# Patient Record
Sex: MALE | Race: WHITE
[De-identification: names, ages, dates, MRNs, and addresses within clinical notes are randomized per-mention and may not be internally consistent; named-entity substitution may affect disease eponyms.]

---

## 2020-05-14 ENCOUNTER — HOSPITAL ENCOUNTER (EMERGENCY)
Dept: HOSPITAL 97 - ER | Age: 20
Discharge: HOME | End: 2020-05-14
Payer: COMMERCIAL

## 2020-05-14 VITALS — TEMPERATURE: 98.7 F

## 2020-05-14 VITALS — DIASTOLIC BLOOD PRESSURE: 79 MMHG | SYSTOLIC BLOOD PRESSURE: 134 MMHG

## 2020-05-14 VITALS — OXYGEN SATURATION: 100 %

## 2020-05-14 DIAGNOSIS — T39.1X4A: ICD-10-CM

## 2020-05-14 DIAGNOSIS — G40.509: ICD-10-CM

## 2020-05-14 DIAGNOSIS — T43.224A: Primary | ICD-10-CM

## 2020-05-14 DIAGNOSIS — F32.9: ICD-10-CM

## 2020-05-14 LAB
ALBUMIN SERPL BCP-MCNC: 4.4 G/DL (ref 3.4–5)
ALP SERPL-CCNC: 84 U/L (ref 45–117)
ALT SERPL W P-5'-P-CCNC: 17 U/L (ref 12–78)
AST SERPL W P-5'-P-CCNC: 14 U/L (ref 15–37)
BUN BLD-MCNC: 12 MG/DL (ref 7–18)
GLUCOSE SERPLBLD-MCNC: 115 MG/DL (ref 74–106)
HCT VFR BLD CALC: 39.4 % (ref 39.6–49)
INR BLD: 1.36
LYMPHOCYTES # SPEC AUTO: 1.1 K/UL (ref 0.7–4.9)
METHAMPHET UR QL SCN: NEGATIVE
MORPHOLOGY BLD-IMP: (no result)
PMV BLD: 9.4 FL (ref 7.6–11.3)
POTASSIUM SERPL-SCNC: 3.5 MMOL/L (ref 3.5–5.1)
RBC # BLD: 4.56 M/UL (ref 4.33–5.43)
THC SERPL-MCNC: NEGATIVE NG/ML

## 2020-05-14 PROCEDURE — 81003 URINALYSIS AUTO W/O SCOPE: CPT

## 2020-05-14 PROCEDURE — 85730 THROMBOPLASTIN TIME PARTIAL: CPT

## 2020-05-14 PROCEDURE — 96361 HYDRATE IV INFUSION ADD-ON: CPT

## 2020-05-14 PROCEDURE — 70450 CT HEAD/BRAIN W/O DYE: CPT

## 2020-05-14 PROCEDURE — 36415 COLL VENOUS BLD VENIPUNCTURE: CPT

## 2020-05-14 PROCEDURE — 80307 DRUG TEST PRSMV CHEM ANLYZR: CPT

## 2020-05-14 PROCEDURE — 80048 BASIC METABOLIC PNL TOTAL CA: CPT

## 2020-05-14 PROCEDURE — 93005 ELECTROCARDIOGRAM TRACING: CPT

## 2020-05-14 PROCEDURE — 85025 COMPLETE CBC W/AUTO DIFF WBC: CPT

## 2020-05-14 PROCEDURE — 80329 ANALGESICS NON-OPIOID 1 OR 2: CPT

## 2020-05-14 PROCEDURE — 96360 HYDRATION IV INFUSION INIT: CPT

## 2020-05-14 PROCEDURE — 85610 PROTHROMBIN TIME: CPT

## 2020-05-14 PROCEDURE — 99285 EMERGENCY DEPT VISIT HI MDM: CPT

## 2020-05-14 PROCEDURE — 83735 ASSAY OF MAGNESIUM: CPT

## 2020-05-14 PROCEDURE — 80076 HEPATIC FUNCTION PANEL: CPT

## 2020-05-14 PROCEDURE — 80320 DRUG SCREEN QUANTALCOHOLS: CPT

## 2020-05-14 NOTE — ER
Nurse's Notes                                                                                     

 [facility]                                                                 

Name: Dexter Marks                                                                               

Age: 19 yrs                                                                                       

Sex: Male                                                                                         

: 2000                                                                                   

MRN: [id number]                                                                                   

Arrival Date: 2020                                                                          

Time: 08:23                                                                                       

Account#: [id number]                                                                            

Bed 3                                                                                             

Private MD:                                                                                       

Diagnosis: Poisoning by other drugs, medicaments and biological substances, undetermined-zoloft   

  and prozac;Epileptic seizures related to external causes, not intractable                       

                                                                                                  

Presentation:                                                                                     

                                                                                             

08:34 Chief complaint: EMS states: Pt took 47 20 mg Prozac last night at 2030; nurse at the   [facility] 

      snf witnessed pt have a seizure at 0700 this morning, pt seizure resolved without        

      intervention. Pt A\T\O for triage, reports mild chest pain. Coronavirus screen: Surgical    

      mask placed on patient. Patient moved to private room, placed in contact and droplet        

      isolation with eye protection until further assessment. Patient denies a cough. Patient     

      denies shortness of breath or difficulty breathing. Patient denies measured and/or          

      subjective temperature greater than 100.4F prior to today's visit. Patient denies           

      travel on a cruise ship or to a country the [facility] currently lists as an affected area.        

      Patient denies contact with known and/or suspected case of COVID-19. Pt from [facility]        

      [facility]. Ebola Screen: No symptoms or risks identified at this time. Initial Sepsis            

      Screen: Does the patient meet any 2 criteria? No. Patient's initial sepsis screen is        

      negative. Does the patient have a suspected source of infection? No. Patient's initial      

      sepsis screen is negative. Risk Assessment: Do you want to hurt yourself or someone         

      else? Patient reports no desire to harm self or others. Onset of symptoms was May 14,       

      2020 at 07:00. Care prior to arrival: None. Transition of care: [facility].               

08:34 Method Of Arrival: EMS: [facility]                                                      [facility] 

08:34 Acuity: CHRIS 3                                                                           jl7 

                                                                                                  

Historical:                                                                                       

- Allergies:                                                                                      

08:43 No Known Allergies;                                                                     jl7 

- Home Meds:                                                                                      

08:43 fluoxetine 20 mg Oral cap [Active];                                                     jl7 

- PMHx:                                                                                           

08:43 Depression;                                                                             jl7 

                                                                                                  

- Immunization history:: Adult Immunizations up to date.                                          

- Social history:: Smoking status: unknown.                                                       

- Family history:: not pertinent.                                                                 

                                                                                                  

                                                                                                  

Screenin:51 Abuse screen: Denies threats or abuse. Denies injuries from another. Nutritional        jl7 

      screening: No deficits noted. Tuberculosis screening: No symptoms or risk factors           

      identified. Fall Risk IV access (20 points). Total Flores Fall Scale indicates No Risk       

      (0-24 pts).                                                                                 

                                                                                                  

Assessment:                                                                                       

08:30 Reassessment: Ruby at [facility] poison control reports serotonin syndrome it the main  jl7 

      concern, watch for agitation, tachycardia, seizures, prolonged QT interval. Monitor pt      

      until labs come back and are normal and VS are trending down.                               

08:35 Reassessment: Pt states "I took Zoloft and fluoxetine because I wanted to get high." pt jl7 

      denies SI/HI.                                                                               

08:51 General: Appears in no apparent distress. uncomfortable, Behavior is calm, cooperative, jl7 

      appropriate for age. Pain: Complains of pain in chest Pain currently is 3 out of 10 on      

      a pain scale. Neuro: Level of Consciousness is awake, alert, obeys commands, Oriented       

      to person, place, time, situation. Cardiovascular: Patient's skin is warm and dry.          

      Respiratory: Airway is patent Respiratory effort is even, unlabored, Respiratory            

      pattern is regular, symmetrical. Derm: Skin is pink, warm \T\ dry.                          

09:48 Reassessment: Pt c/o HA, TANG notified, see MAR for orders.                              jl7 

10:29 Reassessment: Pt will be discharged once repeat EKG and urine sample are provided.      jl7 

10:32 Reassessment: Pt unable to provide urine sample, ERD notified, VO for 1L NS Bolus.      jl7 

                                                                                                  

Vital Signs:                                                                                      

08:34  / 75; Pulse 116; Resp 14; Temp 98.7; Pulse Ox 96% ;                              jl7 

09:48  / 76; Pulse 104; Resp 16 S; Pulse Ox 96% on R/A;                                 jl7 

10:30  / 80; Pulse 98; Resp 16 S; Pulse Ox 100% on R/A;                                 jl7 

11:00  / 85; Pulse 97; Resp 16; Pulse Ox 100% ;                                         jl7 

11:30  / 82; Pulse 92; Resp 16; Pulse Ox 100% ;                                         jl7 

11:59  / 79; Pulse 92; Resp 16; Pulse Ox 100% ;                                         jl7 

                                                                                                  

ED Course:                                                                                        

08:23 Patient arrived in ED.                                                                  [facility] 

08:23 Nam Junior MD is Attending Physician.                                             [facility] 

08:24 Chikis Rothman, RN is Primary Nurse.                                                      jl7 

08:42 Triage completed.                                                                       jl7 

08:43 Arm band placed on right wrist.                                                         jl7 

08:51 Patient has correct armband on for positive identification. Placed in gown. Bed in low  jl7 

      position. Call light in reach. Side rails up X2. Security at bedside. Seizure               

      precautions initiated. Cardiac monitor on. Pulse ox on. NIBP on. Warm blanket given.        

08:51 Initial lab(s) drawn, by ED staff, sent to lab. EKG done, by ED staff, reviewed by      teresita Junior MD. Inserted saline lock: 20 gauge in left hand, using aseptic               

      technique. Blood collected.                                                                 

09:15 CT Head Brain wo Cont In Process Unspecified.                                           [facility]

09:17 Lab(s) recollected, by me, sent to lab.                                                 [facility] 

09:18 EKG done, by EKG tech. reviewed by Nam Junior MD.                                   tc  

10:25 Oracio Reyes MD is Referral Physician.                                             [facility] 

10:51 EKG done, by EKG tech. reviewed by Nam Junior MD.                                   tc  

12:01 No provider procedures requiring assistance completed. IV discontinued, intact,         jl7 

      bleeding controlled, No redness/swelling at site. Pressure dressing applied.                

                                                                                                  

Administered Medications:                                                                         

08:51 Drug: NS 0.9% 1000 ml Route: IV; Rate: 1 bolus; Site: left hand;                        jl7 

10:00 Follow up: Response: No adverse reaction; IV Status: Completed infusion; IV Intake:     jl7 

      1000ml                                                                                      

09:30 Drug: Tylenol 650 mg Route: PO;                                                         jl7 

11:58 Follow up: Response: No adverse reaction; Pain is decreased                             jl7 

10:33 Drug: NS 0.9% 1000 ml Route: IV; Rate: 1 bolus; Site: right hand;                       jl7 

11:15 Follow up: Response: No adverse reaction; IV Status: Completed infusion; IV Intake:     jl7 

      1000ml                                                                                      

                                                                                                  

                                                                                                  

Intake:                                                                                           

10:00 IV: 1000ml; Total: 1000ml.                                                              jl7 

11:15 IV: 1000ml; Total: 2000ml.                                                              jl7 

                                                                                                  

Outcome:                                                                                          

10:25 Discharge ordered by MD.                                                                [facility] 

12:01 Discharged to [employer]                                                           jl7 

12:01 Condition: stable                                                                           

12:01 Discharge instructions given to patient, Instructed on discharge instructions, follow       

      up and referral plans. Demonstrated understanding of instructions, follow-up care.          

12:01 Patient left the ED.                                                                    jl7 

                                                                                                  

Signatures:                                                                                       

Dispatcher [employer]                           Nam Groves MD MD cha Callis, Tiffany, EKG Tech               EK Chikis Magaña RN                        RN   jl7                                                  

                                                                                                  

**************************************************************************************************

## 2020-05-14 NOTE — RAD REPORT
EXAM DESCRIPTION:  CT - Head Brain Wo Cont - 5/14/2020 9:15 am

 

CLINICAL HISTORY:  SEIZURE

Headache, drowsiness, seizure

 

COMPARISON:  No comparisons

 

TECHNIQUE:  All CT scans are performed using dose optimization technique as appropriate and may inclu
de automated exposure control or mA/KV adjustment according to patient size.

 

FINDINGS:  No intracranial hemorrhage, hydrocephalus or extra-axial fluid collection.No areas of brai
n edema or evidence of midline shift.

 

The paranasal sinuses and mastoids are clear. The calvarium is intact.

 

IMPRESSION:  No acute intracranial abnormality.

## 2020-05-14 NOTE — EKG
Test Date:    2020-05-14               Test Time:    08:33:58

Technician:   ABRIL                                    

                                                     

MEASUREMENT RESULTS:                                       

Intervals:                                           

Rate:         114                                    

WI:           138                                    

QRSD:         90                                     

QT:           346                                    

QTc:          476                                    

Axis:                                                

P:            67                                     

WI:           138                                    

QRS:          78                                     

T:            34                                     

                                                     

INTERPRETIVE STATEMENTS:                                       

                                                     

Sinus tachycardia

Otherwise normal ECG

No previous ECG available for comparison



Electronically Signed On 05-14-20 11:13:25 CDT by Orville Hanson

## 2020-05-14 NOTE — EDPHYS
Physician Documentation                                                                           

 [facility]                                                                 

Name: Dexter Marks                                                                               

Age: 19 yrs                                                                                       

Sex: Male                                                                                         

: 2000                                                                                   

MRN: [id number]                                                                                   

Arrival Date: 2020                                                                          

Time: 08:23                                                                                       

Account#: [id number]                                                                            

Bed 3                                                                                             

Private MD:                                                                                       

ED Physician Nam Junior                                                                      

HPI:                                                                                              

                                                                                             

08:27 This 19 yrs old  Male presents to ER via Unassigned with complaints of         quynh 

      overdose last night.                                                                        

08:27 The patient presents to the emergency department after a known overdose, that was       quynh 

      intentional. Context: Method: the patient has a confirmed or suspected ingestion,           

      zoloft 20 tabs, prozac 7 tabs, 2 tylenol. Associated signs and symptoms: Pertinent          

      positives: palpitations. Severity of symptoms: At their worst the symptoms were mild in     

      the emergency department the symptoms are unchanged. The patient presents after having      

      a single isolated seizure, that lasted 1 minute(s). Character of seizure(s): Loss of        

      consciousness: the patient experienced loss of consciousness, Motor activity:               

      generalized, Incontinence: none, Apnea: the patient did not experience apnea,               

      Circulation: the patient did not experience evidence of pulse disturbance. Seizure          

      onset: this morning. Context: the seizure(s) was witnessed, [facility]. Seizure Hx:        

      Original onset: since childhood,\E\ Last seizure: The patient's last seizure is unknown.    

                                                                                                  

Historical:                                                                                       

- Allergies:                                                                                      

08:43 No Known Allergies;                                                                     jl7 

- Home Meds:                                                                                      

08:43 fluoxetine 20 mg Oral cap [Active];                                                     jl7 

- PMHx:                                                                                           

08:43 Depression;                                                                             jl7 

                                                                                                  

- Immunization history:: Adult Immunizations up to date.                                          

- Social history:: Smoking status: unknown.                                                       

- Family history:: not pertinent.                                                                 

                                                                                                  

                                                                                                  

ROS:                                                                                              

08:27 Constitutional: Negative for fever, chills, and weight loss, Eyes: Negative for injury, quynh 

      pain, redness, and discharge, ENT: Negative for injury, pain, and discharge, Neck:          

      Negative for injury, pain, and swelling, Respiratory: Negative for shortness of breath,     

      cough, wheezing, and pleuritic chest pain, Abdomen/GI: Negative for abdominal pain,         

      nausea, vomiting, diarrhea, and constipation, Back: Negative for injury and pain, :       

      Negative for injury, bleeding, discharge, and swelling, MS/Extremity: Negative for          

      injury and deformity, Skin: Negative for injury, rash, and discoloration, Psych:            

      Negative for depression, anxiety, suicide ideation, homicidal ideation, and                 

      hallucinations, Allergy/Immunology: Negative for hives, rash, and allergies, Endocrine:     

      Negative for neck swelling, polydipsia, polyuria, polyphagia, and marked weight             

      changes, Hematologic/Lymphatic: Negative for swollen nodes, abnormal bleeding, and          

      unusual bruising.                                                                           

08:27 Cardiovascular: Positive for palpitations.                                                  

08:27 Neuro: Positive for seizure activity, weakness, this morning in [facility] this            

      morning.                                                                                    

                                                                                                  

Exam:                                                                                             

08:27 Constitutional:  This is a well developed, well nourished patient who is awake, alert,  quynh 

      and in no acute distress. Head/Face:  Normocephalic, atraumatic. Eyes:  Pupils equal        

      round and reactive to light, extra-ocular motions intact.  Lids and lashes normal.          

      Conjunctiva and sclera are non-icteric and not injected.  Cornea within normal limits.      

      Periorbital areas with no swelling, redness, or edema. ENT:  Nares patent. No nasal         

      discharge, no septal abnormalities noted.  Tympanic membranes are normal and external       

      auditory canals are clear.  Oropharynx with no redness, swelling, or masses, exudates,      

      or evidence of obstruction, uvula midline.  Mucous membranes moist. Neck:  Trachea          

      midline, no thyromegaly or masses palpated, and no cervical lymphadenopathy.  Supple,       

      full range of motion without nuchal rigidity, or vertebral point tenderness.  No            

      Meningismus. Chest/axilla:  Normal chest wall appearance and motion.  Nontender with no     

      deformity.  No lesions are appreciated. Respiratory:  Lungs have equal breath sounds        

      bilaterally, clear to auscultation and percussion.  No rales, rhonchi or wheezes noted.     

       No increased work of breathing, no retractions or nasal flaring. Abdomen/GI:  Soft,        

      non-tender, with normal bowel sounds.  No distension or tympany.  No guarding or            

      rebound.  No evidence of tenderness throughout. Back:  No spinal tenderness.  No            

      costovertebral tenderness.  Full range of motion. Male :  Normal genitalia with no        

      discharge or lesions. Skin:  Warm, dry with normal turgor.  Normal color with no            

      rashes, no lesions, and no evidence of cellulitis. MS/ Extremity:  Pulses equal, no         

      cyanosis.  Neurovascular intact.  Full, normal range of motion. Neuro:  Awake and           

      alert, GCS 15, oriented to person, place, time, and situation.  Cranial nerves II-XII       

      grossly intact.  Motor strength 5/5 in all extremities.  Sensory grossly intact.            

      Cerebellar exam normal.  Normal gait. Psych:  Awake, alert, with orientation to person,     

      place and time.  Behavior, mood, and affect are within normal limits.                       

08:27 Cardiovascular: Rate: tachycardic, Rhythm: regular, Pulses: Pulses are 4+ in bilateral      

      radial, brachial, femoral, popliteal, posterior tibial and and dorsalis pedis               

      arteries.. Heart sounds: normal, Edema: is not appreciated, JVD: is not appreciated.        

08:38 ECG was reviewed by the Attending Physician.                                            quynh 

10:46 ECG was reviewed by the Attending Physician.                                            quynh 

                                                                                                  

Vital Signs:                                                                                      

08:34  / 75; Pulse 116; Resp 14; Temp 98.7; Pulse Ox 96% ;                              jl7 

09:48  / 76; Pulse 104; Resp 16 S; Pulse Ox 96% on R/A;                                 jl7 

10:30  / 80; Pulse 98; Resp 16 S; Pulse Ox 100% on R/A;                                 jl7 

11:00  / 85; Pulse 97; Resp 16; Pulse Ox 100% ;                                         jl7 

11:30  / 82; Pulse 92; Resp 16; Pulse Ox 100% ;                                         jl7 

11:59  / 79; Pulse 92; Resp 16; Pulse Ox 100% ;                                         jl7 

                                                                                                  

MDM:                                                                                              

08:23 Patient medically screened.                                                             quynh 

08:35 Data reviewed: vital signs, nurses notes, lab test result(s), EKG, radiologic studies,  quynh 

      CT scan.                                                                                    

09:24 Differential diagnosis: Ingestion/exposure to zoloft and prozac. Differential           quynh 

      diagnosis: drug overdose, cardiac arrhythmia, seizure. Data interpreted: Cardiac            

      monitor: rate is 116 beats/min, rhythm is normal sinus rhythm, Pulse oximetry: on room      

      air is 96 %. Test interpretation: by ED physician or midlevel provider: ECG.                

      Counseling: I had a detailed discussion with the patient and/or guardian regarding: the     

      historical points, exam findings, and any diagnostic results supporting the                 

      discharge/admit diagnosis, lab results, radiology results, the need for outpatient          

      follow up, for definitive care, a neurologist, a psychiatrist.                              

10:14 ED course: pt alert and oriented, no seizures in ed, hr less than 100, intervals ok,    quynh 

      normal.                                                                                     

10:24 ED course: patient aware of plan , will follow up, his intent was to get high not       quynh 

      acting to commit suicide.                                                                   

                                                                                                  

                                                                                             

08:24 Order name: Acetaminophen; Complete Time: 10:10                                         [facility] 

                                                                                             

08:24 Order name: Basic Metabolic Panel; Complete Time: 10:10                                 [facility] 

                                                                                             

08:24 Order name: CBC with Diff; Complete Time: 10:10                                         [facility] 

                                                                                             

08:24 Order name: ETOH Level; Complete Time: 10:10                                            [facility] 

                                                                                             

08:24 Order name: Hepatic Function; Complete Time: 10:10                                      [facility] 

                                                                                             

08:24 Order name: PT-INR; Complete Time: 09:55                                                [facility] 

                                                                                             

08:24 Order name: Ptt, Activated; Complete Time: 09:55                                        [facility] 

                                                                                             

08:24 Order name: Salicylate; Complete Time: 10:10                                            [facility] 

                                                                                             

08:24 Order name: Urine Drug Screen                                                           [facility] 

                                                                                             

08:37 Order name: CT Head Brain wo Cont; Complete Time: 09:55                                 [facility] 

                                                                                             

09:19 Order name: Magnesium; Complete Time: 10:23                                             [facility] 

                                                                                             

10:05 Order name: Manual Differential; Complete Time: 10:10                                   [facility]

                                                                                             

11:58 Order name: Urine Dipstick--Ancillary (enter results)                                                                                                                                

08:24 Order name: EKG; Complete Time: 08:25                                                   [facility] 

                                                                                             

08:24 Order name: EKG - Nurse/Tech; Complete Time: 08:44                                      [facility] 

                                                                                             

08:24 Order name: IV Saline Lock; Complete Time: 08:44                                        [facility] 

                                                                                             

08:24 Order name: Labs collected and sent; Complete Time: 08:43                               [facility] 

                                                                                             

08:24 Order name: Seizure Precautions; Complete Time: 08:51                                   quynh 

                                                                                             

08:37 Order name: Misc. Order: call poison control follow recommendations; Complete Time:     [facility] 

      08:43                                                                                       

                                                                                             

08:46 Order name: Labs - recollect needed: recollect all; Complete Time: 09:17                  

                                                                                             

10:11 Order name: EKG; Complete Time: 10:11                                                   [facility] 

                                                                                             

10:11 Order name: EKG - Nurse/Tech; Complete Time: 11:58                                      [facility] 

                                                                                                  

EC:38 Rate is 114 beats/min. Rhythm is regular. QRS Axis is Normal. [place] interval is normal.    quynh 

      QRS interval is normal. QT interval is normal. No Q waves. T waves are Normal. No ST        

      changes noted. Clinical impression: Sinus tachycardia and No evidence of ischemia.          

      Interpreted by me. Reviewed by me.                                                          

10:46 Rate is 98 beats/min. Rhythm is regular. QRS Axis is Normal. [place] interval is normal. QRS quynh 

      interval is normal. QT interval is normal. No Q waves. T waves are Normal. No ST            

      changes noted. Clinical impression: Normal ECG. Interpreted by me. Reviewed by me.          

                                                                                                  

Administered Medications:                                                                         

08:51 Drug: NS 0.9% 1000 ml Route: IV; Rate: 1 bolus; Site: left hand;                        jl7 

10:00 Follow up: Response: No adverse reaction; IV Status: Completed infusion; IV Intake:     jl7 

      1000ml                                                                                      

09:30 Drug: Tylenol 650 mg Route: PO;                                                         jl7 

11:58 Follow up: Response: No adverse reaction; Pain is decreased                             jl7 

10:33 Drug: NS 0.9% 1000 ml Route: IV; Rate: 1 bolus; Site: right hand;                       jl7 

11:15 Follow up: Response: No adverse reaction; IV Status: Completed infusion; IV Intake:     jl7 

      1000ml                                                                                      

                                                                                                  

                                                                                                  

Disposition:                                                                                      

20 10:25 Discharged to Home. Impression: Poisoning by other drugs, medicaments and          

  biological substances, undetermined - zoloft and prozac, Epileptic seizures                     

  related to external causes, not intractable.                                                    

- Condition is Stable.                                                                            

- Discharge Instructions: Nonepileptic Seizures, Seizure, Adult, Suicidal Feelings: How           

  to Help Yourself, Helping Someone Who is Suicidal, Seizure, Adult, Easy-to-Read, Drug           

  Overdose.                                                                                       

                                                                                                  

- Medication Reconciliation Form, Thank You Letter, Antibiotic Education, Prescription            

  Opioid Use form.                                                                                

- Follow up: Private Physician; When: 2 - 3 days; Reason: Recheck today's complaints,             

  Continuance of care, Re-evaluation by your physician. Follow up: Oracio Reyes;               

  When: 2 - 3 days; Reason: Recheck today's complaints, Re-evaluation by your physician.          

- Problem is new.                                                                                 

- Symptoms have improved.                                                                         

                                                                                                  

                                                                                                  

                                                                                                  

Signatures:                                                                                       

Dispatcher [employer]                           EDMS                                                 

Mishel Trujillo Corey, MD MD cha Leal, Jahala, RN                        RN   jl7                                                  

                                                                                                  

Corrections: (The following items were deleted from the chart)                                    

12:01 10:25 2020 10:25 Discharged to Home. Impression: Poisoning by other drugs,        jl7 

      medicaments and biological substances, undetermined - zoloft and prozac; Epileptic          

      seizures related to external causes, not intractable. Condition is Stable. Discharge        

      Instructions: Nonepileptic Seizures, Seizure, Adult, Suicidal Feelings: How to Help         

      Yourself, Helping Someone Who is Suicidal, Seizure, Adult, Easy-to-Read, Drug Overdose.     

      Forms are Medication Reconciliation Form, Thank You Letter, Antibiotic Education,           

      Prescription Opioid Use. Follow up: Private Physician; When: 2 - 3 days; Reason:            

      Recheck today's complaints, Continuance of care, Re-evaluation by your physician.           

      Follow up: Oracio Reyes; When: 2 - 3 days; Reason: Recheck today's complaints,           

      Re-evaluation by your physician. Problem is new. Symptoms have improved. quynh                

                                                                                                  

**************************************************************************************************

## 2020-05-14 NOTE — EKG
Test Date:    2020-05-14               Test Time:    10:42:35

Technician:   ABRIL                                    

                                                     

MEASUREMENT RESULTS:                                       

Intervals:                                           

Rate:         98                                     

SD:           126                                    

QRSD:         88                                     

QT:           366                                    

QTc:          467                                    

Axis:                                                

P:            58                                     

SD:           126                                    

QRS:          73                                     

T:            47                                     

                                                     

INTERPRETIVE STATEMENTS:                                       

                                                     

Normal sinus rhythm

Normal ECG

Compared to ECG 05/14/2020 08:33:58

Sinus tachycardia no longer present



Electronically Signed On 05-14-20 17:32:21 CDT by Orville Hanson